# Patient Record
Sex: MALE | Race: WHITE | NOT HISPANIC OR LATINO | Employment: OTHER | URBAN - METROPOLITAN AREA
[De-identification: names, ages, dates, MRNs, and addresses within clinical notes are randomized per-mention and may not be internally consistent; named-entity substitution may affect disease eponyms.]

---

## 2019-09-25 ENCOUNTER — TRANSCRIBE ORDERS (OUTPATIENT)
Dept: ADMINISTRATIVE | Facility: HOSPITAL | Age: 69
End: 2019-09-25

## 2019-09-25 DIAGNOSIS — G50.0 TRIGEMINAL NEURALGIA: Primary | ICD-10-CM

## 2019-10-16 ENCOUNTER — HOSPITAL ENCOUNTER (OUTPATIENT)
Dept: MRI IMAGING | Facility: CLINIC | Age: 69
Discharge: HOME | End: 2019-10-16
Payer: COMMERCIAL

## 2019-10-16 ENCOUNTER — TRANSCRIBE ORDERS (OUTPATIENT)
Dept: LAB | Facility: CLINIC | Age: 69
End: 2019-10-16

## 2019-10-16 DIAGNOSIS — G50.0 TRIGEMINAL NEURALGIA: ICD-10-CM

## 2019-10-16 DIAGNOSIS — Z51.81 ENCOUNTER FOR THERAPEUTIC DRUG MONITORING: Primary | ICD-10-CM

## 2019-10-16 PROCEDURE — 70553 MRI BRAIN STEM W/O & W/DYE: CPT

## 2019-10-16 PROCEDURE — A9585 GADOBUTROL INJECTION: HCPCS | Performed by: PSYCHIATRY & NEUROLOGY

## 2019-10-16 RX ADMIN — GADOBUTROL 10 ML: 604.72 INJECTION INTRAVENOUS at 14:51

## 2019-10-17 ENCOUNTER — TRANSCRIBE ORDERS (OUTPATIENT)
Dept: ADMINISTRATIVE | Facility: HOSPITAL | Age: 69
End: 2019-10-17

## 2019-10-17 ENCOUNTER — APPOINTMENT (OUTPATIENT)
Dept: LAB | Facility: HOSPITAL | Age: 69
End: 2019-10-17
Attending: PSYCHIATRY & NEUROLOGY
Payer: COMMERCIAL

## 2019-10-17 DIAGNOSIS — R53.81 DEBILITY: ICD-10-CM

## 2019-10-17 DIAGNOSIS — R53.0 NEOPLASTIC MALIGNANT RELATED FATIGUE: ICD-10-CM

## 2019-10-17 DIAGNOSIS — R53.83 TIREDNESS: ICD-10-CM

## 2019-10-17 DIAGNOSIS — G93.3 POST VIRAL SYNDROME: ICD-10-CM

## 2019-10-17 DIAGNOSIS — E63.9 NUTRITIONAL AND METABOLIC CARDIOMYOPATHY (HCC): ICD-10-CM

## 2019-10-17 DIAGNOSIS — G60.9 IDIOPATHIC PERIPHERAL NEUROPATHY: ICD-10-CM

## 2019-10-17 DIAGNOSIS — E61.9 DEFICIENCY OF NUTRIENT ELEMENT: ICD-10-CM

## 2019-10-17 DIAGNOSIS — R53.1 ASTHENIA: ICD-10-CM

## 2019-10-17 DIAGNOSIS — E11.9 DIABETES MELLITUS WITHOUT COMPLICATION (HCC): ICD-10-CM

## 2019-10-17 DIAGNOSIS — E21.3 HYPERPARATHYROIDISM, UNSPECIFIED (HCC): ICD-10-CM

## 2019-10-17 DIAGNOSIS — E21.3 HYPERPARATHYROIDISM, UNSPECIFIED (HCC): Primary | ICD-10-CM

## 2019-10-17 DIAGNOSIS — R73.09 OTHER ABNORMAL GLUCOSE: ICD-10-CM

## 2019-10-17 DIAGNOSIS — I43 NUTRITIONAL AND METABOLIC CARDIOMYOPATHY (HCC): ICD-10-CM

## 2019-10-17 DIAGNOSIS — R73.9 BLOOD GLUCOSE ELEVATED: ICD-10-CM

## 2019-10-17 DIAGNOSIS — E64.9 SEQUELA OF NUTRITIONAL DEFICIENCY: ICD-10-CM

## 2019-10-17 DIAGNOSIS — R73.09 IMPAIRED GLUCOSE TOLERANCE TEST: ICD-10-CM

## 2019-10-17 DIAGNOSIS — E88.9 NUTRITIONAL AND METABOLIC CARDIOMYOPATHY (HCC): ICD-10-CM

## 2019-10-17 DIAGNOSIS — M12.9 ACUTE ARTHROPATHY: ICD-10-CM

## 2019-10-17 DIAGNOSIS — Z13.220 SCREENING FOR LIPOID DISORDERS: ICD-10-CM

## 2019-10-17 LAB
25(OH)D3 SERPL-MCNC: 35.6 NG/ML (ref 30–100)
ALBUMIN SERPL BCP-MCNC: 4.2 G/DL (ref 3.5–5)
ALP SERPL-CCNC: 98 U/L (ref 46–116)
ALT SERPL W P-5'-P-CCNC: 21 U/L (ref 12–78)
ANION GAP SERPL CALCULATED.3IONS-SCNC: 10 MMOL/L (ref 4–13)
AST SERPL W P-5'-P-CCNC: 15 U/L (ref 5–45)
BASOPHILS # BLD AUTO: 0.06 THOUSANDS/ΜL (ref 0–0.1)
BASOPHILS NFR BLD AUTO: 1 % (ref 0–1)
BILIRUB DIRECT SERPL-MCNC: 0.12 MG/DL (ref 0–0.2)
BILIRUB SERPL-MCNC: 0.3 MG/DL (ref 0.2–1)
BUN SERPL-MCNC: 18 MG/DL (ref 5–25)
CALCIUM SERPL-MCNC: 9 MG/DL (ref 8.3–10.1)
CHLORIDE SERPL-SCNC: 101 MMOL/L (ref 100–108)
CO2 SERPL-SCNC: 29 MMOL/L (ref 21–32)
CREAT SERPL-MCNC: 0.87 MG/DL (ref 0.6–1.3)
CRP SERPL QL: <3 MG/L
EOSINOPHIL # BLD AUTO: 0.17 THOUSAND/ΜL (ref 0–0.61)
EOSINOPHIL NFR BLD AUTO: 4 % (ref 0–6)
ERYTHROCYTE [DISTWIDTH] IN BLOOD BY AUTOMATED COUNT: 12.3 % (ref 11.6–15.1)
ERYTHROCYTE [SEDIMENTATION RATE] IN BLOOD: 4 MM/HOUR (ref 0–10)
EST. AVERAGE GLUCOSE BLD GHB EST-MCNC: 214 MG/DL
GFR SERPL CREATININE-BSD FRML MDRD: 90 ML/MIN/1.73SQ M
GLUCOSE SERPL-MCNC: 156 MG/DL (ref 65–140)
HBA1C MFR BLD: 9.1 % (ref 4.2–6.3)
HCT VFR BLD AUTO: 44.3 % (ref 36.5–49.3)
HGB BLD-MCNC: 14.7 G/DL (ref 12–17)
IMM GRANULOCYTES # BLD AUTO: 0.01 THOUSAND/UL (ref 0–0.2)
IMM GRANULOCYTES NFR BLD AUTO: 0 % (ref 0–2)
LYMPHOCYTES # BLD AUTO: 1.03 THOUSANDS/ΜL (ref 0.6–4.47)
LYMPHOCYTES NFR BLD AUTO: 23 % (ref 14–44)
MAGNESIUM SERPL-MCNC: 1.8 MG/DL (ref 1.6–2.6)
MCH RBC QN AUTO: 31.1 PG (ref 26.8–34.3)
MCHC RBC AUTO-ENTMCNC: 33.2 G/DL (ref 31.4–37.4)
MCV RBC AUTO: 94 FL (ref 82–98)
MONOCYTES # BLD AUTO: 0.42 THOUSAND/ΜL (ref 0.17–1.22)
MONOCYTES NFR BLD AUTO: 10 % (ref 4–12)
NEUTROPHILS # BLD AUTO: 2.75 THOUSANDS/ΜL (ref 1.85–7.62)
NEUTS SEG NFR BLD AUTO: 62 % (ref 43–75)
NRBC BLD AUTO-RTO: 0 /100 WBCS
PHOSPHATE SERPL-MCNC: 3.4 MG/DL (ref 2.3–4.1)
PLATELET # BLD AUTO: 234 THOUSANDS/UL (ref 149–390)
PMV BLD AUTO: 9.7 FL (ref 8.9–12.7)
POTASSIUM SERPL-SCNC: 4.9 MMOL/L (ref 3.5–5.3)
PROT SERPL-MCNC: 7.4 G/DL (ref 6.4–8.2)
PTH-INTACT SERPL-MCNC: 39.4 PG/ML (ref 18.4–80.1)
RBC # BLD AUTO: 4.72 MILLION/UL (ref 3.88–5.62)
SODIUM SERPL-SCNC: 140 MMOL/L (ref 136–145)
TSH SERPL DL<=0.05 MIU/L-ACNC: 5.11 UIU/ML (ref 0.36–3.74)
VIT B12 SERPL-MCNC: 308 PG/ML (ref 100–900)
WBC # BLD AUTO: 4.44 THOUSAND/UL (ref 4.31–10.16)

## 2019-10-17 PROCEDURE — 80051 ELECTROLYTE PANEL: CPT

## 2019-10-17 PROCEDURE — 84165 PROTEIN E-PHORESIS SERUM: CPT | Performed by: PATHOLOGY

## 2019-10-17 PROCEDURE — 82306 VITAMIN D 25 HYDROXY: CPT

## 2019-10-17 PROCEDURE — 83970 ASSAY OF PARATHORMONE: CPT

## 2019-10-17 PROCEDURE — 86038 ANTINUCLEAR ANTIBODIES: CPT

## 2019-10-17 PROCEDURE — 82565 ASSAY OF CREATININE: CPT

## 2019-10-17 PROCEDURE — 83036 HEMOGLOBIN GLYCOSYLATED A1C: CPT

## 2019-10-17 PROCEDURE — 83825 ASSAY OF MERCURY: CPT

## 2019-10-17 PROCEDURE — 36415 COLL VENOUS BLD VENIPUNCTURE: CPT

## 2019-10-17 PROCEDURE — 85025 COMPLETE CBC W/AUTO DIFF WBC: CPT

## 2019-10-17 PROCEDURE — 86225 DNA ANTIBODY NATIVE: CPT

## 2019-10-17 PROCEDURE — 82947 ASSAY GLUCOSE BLOOD QUANT: CPT

## 2019-10-17 PROCEDURE — 86157 COLD AGGLUTININ TITER: CPT

## 2019-10-17 PROCEDURE — 82164 ANGIOTENSIN I ENZYME TEST: CPT

## 2019-10-17 PROCEDURE — 83018 HEAVY METAL QUAN EACH NES: CPT

## 2019-10-17 PROCEDURE — 86430 RHEUMATOID FACTOR TEST QUAL: CPT

## 2019-10-17 PROCEDURE — 86235 NUCLEAR ANTIGEN ANTIBODY: CPT

## 2019-10-17 PROCEDURE — 82175 ASSAY OF ARSENIC: CPT

## 2019-10-17 PROCEDURE — 82310 ASSAY OF CALCIUM: CPT

## 2019-10-17 PROCEDURE — 82595 ASSAY OF CRYOGLOBULIN: CPT

## 2019-10-17 PROCEDURE — 82570 ASSAY OF URINE CREATININE: CPT

## 2019-10-17 PROCEDURE — 82300 ASSAY OF CADMIUM: CPT

## 2019-10-17 PROCEDURE — 83883 ASSAY NEPHELOMETRY NOT SPEC: CPT

## 2019-10-17 PROCEDURE — 84443 ASSAY THYROID STIM HORMONE: CPT

## 2019-10-17 PROCEDURE — 84165 PROTEIN E-PHORESIS SERUM: CPT

## 2019-10-17 PROCEDURE — 83735 ASSAY OF MAGNESIUM: CPT

## 2019-10-17 PROCEDURE — 86618 LYME DISEASE ANTIBODY: CPT

## 2019-10-17 PROCEDURE — 86140 C-REACTIVE PROTEIN: CPT

## 2019-10-17 PROCEDURE — 85652 RBC SED RATE AUTOMATED: CPT

## 2019-10-17 PROCEDURE — 80076 HEPATIC FUNCTION PANEL: CPT

## 2019-10-17 PROCEDURE — 83655 ASSAY OF LEAD: CPT

## 2019-10-17 PROCEDURE — 82607 VITAMIN B-12: CPT

## 2019-10-17 PROCEDURE — 84100 ASSAY OF PHOSPHORUS: CPT

## 2019-10-17 PROCEDURE — 84520 ASSAY OF UREA NITROGEN: CPT

## 2019-10-18 LAB
ACE SERPL-CCNC: 16 U/L (ref 14–82)
B BURGDOR IGG+IGM SER-ACNC: <0.91 ISR (ref 0–0.9)
CA TITR SERPL AGGL: NEGATIVE {TITER}
DSDNA AB SER-ACNC: <1 IU/ML (ref 0–9)
ENA SS-A AB SER-ACNC: <0.2 AI (ref 0–0.9)
ENA SS-B AB SER-ACNC: <0.2 AI (ref 0–0.9)
KAPPA LC FREE SER-MCNC: 22.4 MG/L (ref 3.3–19.4)
KAPPA LC FREE/LAMBDA FREE SER: 1.47 {RATIO} (ref 0.26–1.65)
LAMBDA LC FREE SERPL-MCNC: 15.2 MG/L (ref 5.7–26.3)
RHEUMATOID FACT SER QL LA: NEGATIVE
RYE IGE QN: NEGATIVE

## 2019-10-19 LAB
ALBUMIN SERPL ELPH-MCNC: 4.53 G/DL (ref 3.5–5)
ALBUMIN SERPL ELPH-MCNC: 64.7 % (ref 52–65)
ALPHA1 GLOB SERPL ELPH-MCNC: 0.24 G/DL (ref 0.1–0.4)
ALPHA1 GLOB SERPL ELPH-MCNC: 3.4 % (ref 2.5–5)
ALPHA2 GLOB SERPL ELPH-MCNC: 0.71 G/DL (ref 0.4–1.2)
ALPHA2 GLOB SERPL ELPH-MCNC: 10.2 % (ref 7–13)
BETA GLOB ABNORMAL SERPL ELPH-MCNC: 0.39 G/DL (ref 0.4–0.8)
BETA1 GLOB SERPL ELPH-MCNC: 5.5 % (ref 5–13)
BETA2 GLOB SERPL ELPH-MCNC: 5 % (ref 2–8)
BETA2+GAMMA GLOB SERPL ELPH-MCNC: 0.35 G/DL (ref 0.2–0.5)
GAMMA GLOB ABNORMAL SERPL ELPH-MCNC: 0.78 G/DL (ref 0.5–1.6)
GAMMA GLOB SERPL ELPH-MCNC: 11.2 % (ref 12–22)
IGG/ALB SER: 1.83 {RATIO} (ref 1.1–1.8)
PROT PATTERN SERPL ELPH-IMP: ABNORMAL
PROT SERPL-MCNC: 7 G/DL (ref 6.4–8.2)

## 2019-10-22 LAB
ARSENIC BLD-MCNC: 5 UG/L (ref 2–23)
CADMIUM BLD-MCNC: NORMAL UG/L (ref 0–1.2)
CRYOGLOB SER QL 1D COLD INC: NORMAL
LEAD BLD-MCNC: 1 UG/DL (ref 0–4)
MERCURY BLD-MCNC: NORMAL UG/L (ref 0–14.9)

## 2019-10-28 LAB — MISCELLANEOUS LAB TEST RESULT: NORMAL

## 2021-06-24 ENCOUNTER — APPOINTMENT (EMERGENCY)
Dept: CT IMAGING | Facility: HOSPITAL | Age: 71
End: 2021-06-24
Payer: COMMERCIAL

## 2021-06-24 ENCOUNTER — HOSPITAL ENCOUNTER (OUTPATIENT)
Facility: HOSPITAL | Age: 71
Setting detail: OBSERVATION
End: 2021-06-25
Attending: EMERGENCY MEDICINE | Admitting: FAMILY MEDICINE
Payer: COMMERCIAL

## 2021-06-24 DIAGNOSIS — H53.122 VISUAL LOSS, TRANSIENT, LEFT: Primary | ICD-10-CM

## 2021-06-24 LAB
ALBUMIN SERPL BCP-MCNC: 4.3 G/DL (ref 3.5–5)
ALP SERPL-CCNC: 93 U/L (ref 46–116)
ALT SERPL W P-5'-P-CCNC: 30 U/L (ref 12–78)
ANION GAP SERPL CALCULATED.3IONS-SCNC: 8 MMOL/L (ref 4–13)
AST SERPL W P-5'-P-CCNC: 20 U/L (ref 5–45)
BASOPHILS # BLD AUTO: 0.07 THOUSANDS/ΜL (ref 0–0.1)
BASOPHILS NFR BLD AUTO: 1 % (ref 0–1)
BILIRUB SERPL-MCNC: 0.43 MG/DL (ref 0.2–1)
BUN SERPL-MCNC: 32 MG/DL (ref 5–25)
CALCIUM SERPL-MCNC: 9.4 MG/DL (ref 8.3–10.1)
CHLORIDE SERPL-SCNC: 103 MMOL/L (ref 100–108)
CO2 SERPL-SCNC: 29 MMOL/L (ref 21–32)
CREAT SERPL-MCNC: 1.22 MG/DL (ref 0.6–1.3)
EOSINOPHIL # BLD AUTO: 0.19 THOUSAND/ΜL (ref 0–0.61)
EOSINOPHIL NFR BLD AUTO: 3 % (ref 0–6)
ERYTHROCYTE [DISTWIDTH] IN BLOOD BY AUTOMATED COUNT: 13.6 % (ref 11.6–15.1)
GFR SERPL CREATININE-BSD FRML MDRD: 61 ML/MIN/1.73SQ M
GLUCOSE SERPL-MCNC: 123 MG/DL (ref 65–140)
HCT VFR BLD AUTO: 44.9 % (ref 36.5–49.3)
HGB BLD-MCNC: 14.7 G/DL (ref 12–17)
IMM GRANULOCYTES # BLD AUTO: 0.02 THOUSAND/UL (ref 0–0.2)
IMM GRANULOCYTES NFR BLD AUTO: 0 % (ref 0–2)
LYMPHOCYTES # BLD AUTO: 1.85 THOUSANDS/ΜL (ref 0.6–4.47)
LYMPHOCYTES NFR BLD AUTO: 26 % (ref 14–44)
MCH RBC QN AUTO: 31.3 PG (ref 26.8–34.3)
MCHC RBC AUTO-ENTMCNC: 32.7 G/DL (ref 31.4–37.4)
MCV RBC AUTO: 96 FL (ref 82–98)
MONOCYTES # BLD AUTO: 0.58 THOUSAND/ΜL (ref 0.17–1.22)
MONOCYTES NFR BLD AUTO: 8 % (ref 4–12)
NEUTROPHILS # BLD AUTO: 4.33 THOUSANDS/ΜL (ref 1.85–7.62)
NEUTS SEG NFR BLD AUTO: 62 % (ref 43–75)
NRBC BLD AUTO-RTO: 0 /100 WBCS
PLATELET # BLD AUTO: 260 THOUSANDS/UL (ref 149–390)
PMV BLD AUTO: 10.9 FL (ref 8.9–12.7)
POTASSIUM SERPL-SCNC: 4.8 MMOL/L (ref 3.5–5.3)
PROT SERPL-MCNC: 7.6 G/DL (ref 6.4–8.2)
RBC # BLD AUTO: 4.69 MILLION/UL (ref 3.88–5.62)
SODIUM SERPL-SCNC: 140 MMOL/L (ref 136–145)
TROPONIN I SERPL-MCNC: <0.02 NG/ML
WBC # BLD AUTO: 7.04 THOUSAND/UL (ref 4.31–10.16)

## 2021-06-24 PROCEDURE — 70498 CT ANGIOGRAPHY NECK: CPT

## 2021-06-24 PROCEDURE — 99285 EMERGENCY DEPT VISIT HI MDM: CPT | Performed by: EMERGENCY MEDICINE

## 2021-06-24 PROCEDURE — 36415 COLL VENOUS BLD VENIPUNCTURE: CPT | Performed by: PHYSICIAN ASSISTANT

## 2021-06-24 PROCEDURE — 99285 EMERGENCY DEPT VISIT HI MDM: CPT

## 2021-06-24 PROCEDURE — 93005 ELECTROCARDIOGRAM TRACING: CPT

## 2021-06-24 PROCEDURE — 99220 PR INITIAL OBSERVATION CARE/DAY 70 MINUTES: CPT | Performed by: PHYSICIAN ASSISTANT

## 2021-06-24 PROCEDURE — 70496 CT ANGIOGRAPHY HEAD: CPT

## 2021-06-24 PROCEDURE — 84484 ASSAY OF TROPONIN QUANT: CPT | Performed by: PHYSICIAN ASSISTANT

## 2021-06-24 PROCEDURE — 80053 COMPREHEN METABOLIC PANEL: CPT | Performed by: PHYSICIAN ASSISTANT

## 2021-06-24 PROCEDURE — 85025 COMPLETE CBC W/AUTO DIFF WBC: CPT | Performed by: PHYSICIAN ASSISTANT

## 2021-06-24 RX ORDER — GABAPENTIN 100 MG/1
100 CAPSULE ORAL 2 TIMES DAILY
COMMUNITY

## 2021-06-24 RX ORDER — INSULIN GLARGINE 100 [IU]/ML
20 INJECTION, SOLUTION SUBCUTANEOUS
COMMUNITY

## 2021-06-24 RX ORDER — DORZOLAMIDE HYDROCHLORIDE AND TIMOLOL MALEATE 20; 5 MG/ML; MG/ML
1 SOLUTION/ DROPS OPHTHALMIC 2 TIMES DAILY
Status: DISCONTINUED | OUTPATIENT
Start: 2021-06-24 | End: 2021-06-25 | Stop reason: HOSPADM

## 2021-06-24 RX ORDER — BRIMONIDINE TARTRATE 0.15 %
1 DROPS OPHTHALMIC (EYE) 2 TIMES DAILY
Status: DISCONTINUED | OUTPATIENT
Start: 2021-06-24 | End: 2021-06-25 | Stop reason: HOSPADM

## 2021-06-24 RX ORDER — PSEUDOEPHEDRINE HCL 30 MG
100 TABLET ORAL DAILY
COMMUNITY

## 2021-06-24 RX ORDER — GLIPIZIDE 10 MG/1
10 TABLET ORAL
COMMUNITY

## 2021-06-24 RX ORDER — GABAPENTIN 600 MG/1
600 TABLET ORAL 3 TIMES DAILY
COMMUNITY

## 2021-06-24 RX ORDER — CHLORAL HYDRATE 500 MG
2 CAPSULE ORAL
COMMUNITY

## 2021-06-24 RX ORDER — AMLODIPINE BESYLATE 5 MG/1
5 TABLET ORAL DAILY
COMMUNITY

## 2021-06-24 RX ORDER — LISINOPRIL 20 MG/1
20 TABLET ORAL DAILY
COMMUNITY

## 2021-06-24 RX ADMIN — BRIMONIDINE TARTRATE 1 DROP: 1.5 SOLUTION OPHTHALMIC at 20:43

## 2021-06-24 RX ADMIN — IOHEXOL 85 ML: 350 INJECTION, SOLUTION INTRAVENOUS at 20:35

## 2021-06-24 RX ADMIN — DORZOLAMIDE HYDROCHLORIDE AND TIMOLOL MALEATE 1 DROP: 22.3; 6.8 SOLUTION/ DROPS OPHTHALMIC at 20:29

## 2021-06-25 ENCOUNTER — APPOINTMENT (OUTPATIENT)
Dept: MRI IMAGING | Facility: HOSPITAL | Age: 71
End: 2021-06-25
Payer: COMMERCIAL

## 2021-06-25 ENCOUNTER — APPOINTMENT (OUTPATIENT)
Dept: NON INVASIVE DIAGNOSTICS | Facility: HOSPITAL | Age: 71
End: 2021-06-25
Payer: COMMERCIAL

## 2021-06-25 VITALS
HEIGHT: 73 IN | DIASTOLIC BLOOD PRESSURE: 66 MMHG | SYSTOLIC BLOOD PRESSURE: 127 MMHG | BODY MASS INDEX: 30.34 KG/M2 | TEMPERATURE: 97.7 F | RESPIRATION RATE: 23 BRPM | OXYGEN SATURATION: 96 % | HEART RATE: 57 BPM

## 2021-06-25 PROBLEM — G50.0 TRIGEMINAL NEURALGIA: Status: ACTIVE | Noted: 2020-01-29

## 2021-06-25 PROBLEM — H53.122: Status: ACTIVE | Noted: 2021-06-25

## 2021-06-25 PROBLEM — E11.9 DIABETES MELLITUS TYPE 2, CONTROLLED (HCC): Status: ACTIVE | Noted: 2021-06-25

## 2021-06-25 PROBLEM — Z86.69 HISTORY OF RETINAL DETACHMENT: Chronic | Status: ACTIVE | Noted: 2021-06-25

## 2021-06-25 LAB
CHOLEST SERPL-MCNC: 120 MG/DL (ref 50–200)
EST. AVERAGE GLUCOSE BLD GHB EST-MCNC: 169 MG/DL
GLUCOSE SERPL-MCNC: 174 MG/DL (ref 65–140)
GLUCOSE SERPL-MCNC: 201 MG/DL (ref 65–140)
GLUCOSE SERPL-MCNC: 213 MG/DL (ref 65–140)
HBA1C MFR BLD: 7.5 %
HDLC SERPL-MCNC: 41 MG/DL
LDLC SERPL CALC-MCNC: 39 MG/DL (ref 0–100)
TRIGL SERPL-MCNC: 198 MG/DL

## 2021-06-25 PROCEDURE — G1004 CDSM NDSC: HCPCS

## 2021-06-25 PROCEDURE — 93306 TTE W/DOPPLER COMPLETE: CPT

## 2021-06-25 PROCEDURE — 99217 PR OBSERVATION CARE DISCHARGE MANAGEMENT: CPT | Performed by: STUDENT IN AN ORGANIZED HEALTH CARE EDUCATION/TRAINING PROGRAM

## 2021-06-25 PROCEDURE — 83036 HEMOGLOBIN GLYCOSYLATED A1C: CPT | Performed by: PHYSICIAN ASSISTANT

## 2021-06-25 PROCEDURE — 82948 REAGENT STRIP/BLOOD GLUCOSE: CPT

## 2021-06-25 PROCEDURE — 36415 COLL VENOUS BLD VENIPUNCTURE: CPT | Performed by: PHYSICIAN ASSISTANT

## 2021-06-25 PROCEDURE — 99285 EMERGENCY DEPT VISIT HI MDM: CPT | Performed by: PSYCHIATRY & NEUROLOGY

## 2021-06-25 PROCEDURE — 93306 TTE W/DOPPLER COMPLETE: CPT | Performed by: INTERNAL MEDICINE

## 2021-06-25 PROCEDURE — 70551 MRI BRAIN STEM W/O DYE: CPT

## 2021-06-25 PROCEDURE — 80061 LIPID PANEL: CPT | Performed by: PHYSICIAN ASSISTANT

## 2021-06-25 RX ORDER — AMLODIPINE BESYLATE 5 MG/1
5 TABLET ORAL DAILY
Status: DISCONTINUED | OUTPATIENT
Start: 2021-06-25 | End: 2021-06-25 | Stop reason: HOSPADM

## 2021-06-25 RX ORDER — HEPARIN SODIUM 5000 [USP'U]/ML
5000 INJECTION, SOLUTION INTRAVENOUS; SUBCUTANEOUS EVERY 8 HOURS SCHEDULED
Status: DISCONTINUED | OUTPATIENT
Start: 2021-06-25 | End: 2021-06-25 | Stop reason: HOSPADM

## 2021-06-25 RX ORDER — GABAPENTIN 300 MG/1
600 CAPSULE ORAL 3 TIMES DAILY
Status: DISCONTINUED | OUTPATIENT
Start: 2021-06-25 | End: 2021-06-25 | Stop reason: HOSPADM

## 2021-06-25 RX ORDER — ATORVASTATIN CALCIUM 10 MG/1
10 TABLET, FILM COATED ORAL EVERY EVENING
Status: DISCONTINUED | OUTPATIENT
Start: 2021-06-25 | End: 2021-06-25 | Stop reason: HOSPADM

## 2021-06-25 RX ORDER — INSULIN GLARGINE 100 [IU]/ML
20 INJECTION, SOLUTION SUBCUTANEOUS
Status: DISCONTINUED | OUTPATIENT
Start: 2021-06-25 | End: 2021-06-25 | Stop reason: HOSPADM

## 2021-06-25 RX ORDER — DOCUSATE SODIUM 100 MG/1
100 CAPSULE, LIQUID FILLED ORAL DAILY
Status: DISCONTINUED | OUTPATIENT
Start: 2021-06-25 | End: 2021-06-25 | Stop reason: HOSPADM

## 2021-06-25 RX ORDER — ASPIRIN 81 MG/1
81 TABLET, CHEWABLE ORAL DAILY
Status: DISCONTINUED | OUTPATIENT
Start: 2021-06-25 | End: 2021-06-25 | Stop reason: HOSPADM

## 2021-06-25 RX ORDER — LISINOPRIL 10 MG/1
20 TABLET ORAL DAILY
Status: DISCONTINUED | OUTPATIENT
Start: 2021-06-25 | End: 2021-06-25 | Stop reason: HOSPADM

## 2021-06-25 RX ADMIN — GABAPENTIN 600 MG: 300 CAPSULE ORAL at 08:44

## 2021-06-25 RX ADMIN — INSULIN LISPRO 1 UNITS: 100 INJECTION, SOLUTION INTRAVENOUS; SUBCUTANEOUS at 11:07

## 2021-06-25 RX ADMIN — BRIMONIDINE TARTRATE 1 DROP: 1.5 SOLUTION OPHTHALMIC at 08:45

## 2021-06-25 RX ADMIN — INSULIN GLARGINE 20 UNITS: 100 INJECTION, SOLUTION SUBCUTANEOUS at 01:00

## 2021-06-25 RX ADMIN — AMLODIPINE BESYLATE 5 MG: 5 TABLET ORAL at 08:44

## 2021-06-25 RX ADMIN — LISINOPRIL 20 MG: 10 TABLET ORAL at 08:44

## 2021-06-25 RX ADMIN — DOCUSATE SODIUM 100 MG: 100 CAPSULE, LIQUID FILLED ORAL at 08:45

## 2021-06-25 RX ADMIN — DORZOLAMIDE HYDROCHLORIDE AND TIMOLOL MALEATE 1 DROP: 22.3; 6.8 SOLUTION/ DROPS OPHTHALMIC at 08:45

## 2021-06-25 RX ADMIN — ASPIRIN 81 MG CHEWABLE TABLET 81 MG: 81 TABLET CHEWABLE at 08:44

## 2021-06-25 RX ADMIN — INSULIN LISPRO 1 UNITS: 100 INJECTION, SOLUTION INTRAVENOUS; SUBCUTANEOUS at 08:43

## 2021-06-25 RX ADMIN — HEPARIN SODIUM 5000 UNITS: 5000 INJECTION INTRAVENOUS; SUBCUTANEOUS at 06:10

## 2021-06-25 NOTE — ASSESSMENT & PLAN NOTE
· Around 2:00 p m   Had complete left visual loss for about 45 minutes  · CT A negative for acute infarct, hemorrhage, no retro-orbital inflammation  · See plan above

## 2021-06-25 NOTE — ASSESSMENT & PLAN NOTE
· Follows outpatient with ophthalmologist Dr Mirian Witt in [facility]  · Was referred to ED for stroke rule out  · MRI imaging negative for acute pathology, stroke ruled out per Dr Enrique Eugene request   · Retinal detachment surgery was performed on left eye 1 month ago at [facility]  · Neurology evaluated patient recommending ophtho evaluation  · Discussed case with on call ophthomologist at [facility] at 11:30 AM on 6/25, recommended Retinal Surgery evaluation  · [address]  who initially recommended outpatient follow up on 6/30, later expedited to 6/28, now upon further discussion recommended he be discharged from [facility] and go immediately to their clinic to be evaluated today on 6/25  · Gave instructions to patient and his wife   Discharged

## 2021-06-25 NOTE — ASSESSMENT & PLAN NOTE
44-year-old male with hypertension, dyslipidemia, DM2 with diabetic neuropathy, trigeminal neuralgia status post balloon compression procedure with chronic right facial numbness, recent left retinal detachment with left vitrectomy, presents with visual disturbance of the left eye  CTA head/neck demonstrated no flow consequential stenosis or LVO  CT head demonstrated no acute changes  Patient's left eye visual acuity has improved since starting eye drops in the emergency department  However, eye sight is not back at baseline  Lower suspicion for acute ischemia as etiology of visual disturbance  Higher likelihood of ophthalmologic such as retinal issue, for which patient had recent procedure  Plan:  -per discussion with primary team, patient to be transferred to alternate facility for formal ophthalmological evaluation   -would obtain MRI brain if patient were to stay here  However, this would delay his transfer  Feel he would most benefit from urgent ophthalmological evaluation   -as such, no further inpatient neurologic recommendations

## 2021-06-25 NOTE — DISCHARGE INSTRUCTIONS
Follow up with [facility] in [place] today on 6/25/21  Address is AVS follow up instructions

## 2021-06-25 NOTE — UTILIZATION REVIEW
Initial Clinical Review    Admission: Date/Time/Statement:   Admission Orders (From admission, onward)     Ordered        06/24/21 2133  Place in Observation  Once                   Orders Placed This Encounter   Procedures    Place in Observation     Standing Status:   Standing     Number of Occurrences:   1     Order Specific Question:   Level of Care     Answer:   Med Surg [16]     ED Arrival Information     Expected Arrival Acuity    - 6/24/2021 17:55 Emergent         Means of arrival Escorted by Service Admission type    [address] Emergency         Arrival complaint    post op eye problem        Chief Complaint   Patient presents with    Eye Problem     pt had detached retna surgery in may, pain in L eye, pt reports that he lost his vision for 45 minutes in the L eye today        Initial Presentation:  79 yo male PMHx DM type 2 , hx of retinal detachment to ED from home with left eye vision loss lasting 45 minutes, now resolved  Admitted Observation  TIA, CVA, metabolic abnormality  CTA negative  Labs unremarkable  Elevated pressure of left eye  Discussed with his ophtho attending recommended cosopt BID and brimonidine BID  Will admit to eval for TIA   Opthalmology  recommends outpatient follow up with ophtho after his eval for tia    Date: 6/25   Day 2:       ED Triage Vitals [06/24/21 1814]   Temperature Pulse Respirations Blood Pressure SpO2   97 7 °F (36 5 °C) 75 17 144/77 97 %      Temp Source Heart Rate Source Patient Position - Orthostatic VS BP Location FiO2 (%)   Oral Monitor Sitting Left arm --      Pain Score       --          Wt Readings from Last 1 Encounters:   06/25/21 105 kg (230 lb 13 2 oz)     Additional Vital Signs:   Date/Time  Temp  Pulse  Resp  BP  MAP (mmHg)  SpO2  O2 Device  Patient Position - Orthostatic VS   06/25/21 0900  --  57  23Abnormal   127/66  91  96 %  None (Room air)  Lying   06/25/21 0854  --  53Abnormal   16  127/66  --  96 %  --  Sitting   06/25/21 0600  --  63  16  124/63  --  95 %  --  --   06/25/21 0500  --  66  16  120/65  85  94 %  --  --   06/25/21 0400  --  73  17  104/58  78  93 %  --  --   06/25/21 0300  --  63  21  101/54  76  95 %  --  --   06/25/21 0200  --  56  19  119/62  84  94 %  --  --   06/25/21 0130  --  54Abnormal   16  119/83  98  93 %  None (Room air)  Lying   06/24/21 1945  --  63  18  139/72  99  100 %  None (Room air)  Lying   06/24/21 1814  97 7 °F (36 5 °C)  75  17  144/77  --  97 %  None (Room air)  Sitting      Weights (last 14 days)    Date/Time  Height   06/24/21 1814  6' 1" (1 854 m)       Pertinent Labs/Diagnostic Test Results:       Results from last 7 days   Lab Units 06/24/21  1946   WBC Thousand/uL 7 04   HEMOGLOBIN g/dL 14 7   HEMATOCRIT % 44 9   PLATELETS Thousands/uL 260   NEUTROS ABS Thousands/µL 4 33         Results from last 7 days   Lab Units 06/24/21  1946   SODIUM mmol/L 140   POTASSIUM mmol/L 4 8   CHLORIDE mmol/L 103   CO2 mmol/L 29   ANION GAP mmol/L 8   BUN mg/dL 32*   CREATININE mg/dL 1 22   EGFR ml/min/1 73sq m 61   CALCIUM mg/dL 9 4     Results from last 7 days   Lab Units 06/24/21  1946   AST U/L 20   ALT U/L 30   ALK PHOS U/L 93   TOTAL PROTEIN g/dL 7 6   ALBUMIN g/dL 4 3   TOTAL BILIRUBIN mg/dL 0 43     Results from last 7 days   Lab Units 06/25/21  0723 06/25/21  0057   POC GLUCOSE mg/dl 213* 201*     Results from last 7 days   Lab Units 06/24/21  1946   GLUCOSE RANDOM mg/dL 123         Results from last 7 days   Lab Units 06/25/21  0508   HEMOGLOBIN A1C % 7 5*   EAG mg/dl 169     No results found for: BETA-HYDROXYBUTYRATE                   Results from last 7 days   Lab Units 06/24/21 1946   TROPONIN I ng/mL <0 02     CTA head and neck w wo contrast   Final Result by  MD (06/24 2111)         1  No evidence of acute infarct, intracranial hemorrhage or mass effect  2   Postsurgical change from left vitrectomy     3   No stenosis, dissection or occlusion of the carotid or vertebral arteries or major vessels of the [place]  MRI brain wo contrast    (Results Pending)     6/25 echo= ordered & pending  No ekg available  ED Treatment:   Medication Administration from 06/24/2021 1755 to 06/25/2021 1004       Date/Time Order Dose Route Action     06/25/2021 0845 dorzolamide-timolol (COSOPT) 22 3-6 8 MG/ML ophthalmic solution 1 drop 1 drop Left Eye Given     06/24/2021 2029 dorzolamide-timolol (COSOPT) 22 3-6 8 MG/ML ophthalmic solution 1 drop 1 drop Left Eye Given     06/25/2021 0845 brimonidine (ALPHAGAN P) 0 15 % ophthalmic solution 1 drop 1 drop Left Eye Given     06/24/2021 2043 brimonidine (ALPHAGAN P) 0 15 % ophthalmic solution 1 drop 1 drop Left Eye Given     06/25/2021 0844 amLODIPine (NORVASC) tablet 5 mg 5 mg Oral Given     06/25/2021 0845 docusate sodium (COLACE) capsule 100 mg 100 mg Oral Given     06/25/2021 0844 gabapentin (NEURONTIN) capsule 600 mg 600 mg Oral Given     06/25/2021 0100 insulin glargine (LANTUS) subcutaneous injection 20 Units 0 2 mL 20 Units Subcutaneous Given     06/25/2021 0844 lisinopril (ZESTRIL) tablet 20 mg 20 mg Oral Given     06/25/2021 0844 aspirin chewable tablet 81 mg 81 mg Oral Given     06/25/2021 0610 heparin (porcine) subcutaneous injection 5,000 Units 5,000 Units Subcutaneous Given     06/25/2021 0843 insulin lispro (HumaLOG) 100 units/mL subcutaneous injection 1-5 Units 1 Units Subcutaneous Given        History reviewed  No pertinent past medical history    Present on Admission:   Episode of visual loss of left eye   Diabetes mellitus type 2, controlled ([facility] [address] )      Admitting Diagnosis: Eye problem [H57 9]  Age/Sex: 78 y o  male  Admission Orders:  Telemetry  echo  Neuro checks Q1hr x4hr; Q2hr x8hr; Q4hr x72hr;  Vitals Q1hr x4hr; Q2hr x4hrs; Q4hr x72hr; Q8hr if stable  Nursing dysphagia assessment prior to diet  NPO  Assess NIH stroke scale on admission & DC  PT/OT/SPEECH assess & treat  MRI  scd  Scheduled Medications:  amLODIPine, 5 mg, Oral, Daily  aspirin, 81 mg, Oral, Daily  atorvastatin, 10 mg, Oral, QPM  brimonidine, 1 drop, Left Eye, BID  docusate sodium, 100 mg, Oral, Daily  dorzolamide-timolol, 1 drop, Left Eye, BID  gabapentin, 600 mg, Oral, TID  heparin (porcine), 5,000 Units, Subcutaneous, Q8H RICHARD  insulin glargine, 20 Units, Subcutaneous, HS  insulin lispro, 1-5 Units, Subcutaneous, 4x Daily (AC & HS)  lisinopril, 20 mg, Oral, Daily  Continuous IV Infusions:     PRN Meds:       IP CONSULT TO NEUROLOGY  IP CONSULT TO CASE MANAGEMENT  IP CONSULT TO NUTRITION SERVICES    Network Utilization Review Department  ATTENTION: Please call with any questions or concerns to [phone] and carefully listen to the prompts so that you are directed to the right person  All voicemails are confidential   Hina Woo all requests for admission clinical reviews, approved or denied determinations and any other requests to dedicated fax number below belonging to the campus where the patient is receiving treatment   List of dedicated fax numbers for the Facilities:  [address] DENIALS (Administrative/Medical Necessity) [phone]   [address] (Maternity/NICU/Pediatrics) [phone]   [address] Carlita Medellin [phone] [place] [address] [address]ia [address] [facility] [facility] [phone]

## 2021-06-25 NOTE — ED PROVIDER NOTES
History  Chief Complaint   Patient presents with    Eye Problem     pt had detached retna surgery in may, pain in L eye, pt reports that he lost his vision for 45 minutes in the L eye today      79 yo male pt with vision loss left eye  Transient  Lasted 45 minutes  Described as "vision became static/snowy"  He was still able to vaguely make out the outline of his fingers when placed in from of the eye  Right eye only  Now resolved  Had pain in the eye and a headache when he had that vision loss  Now resolved  He has h/o retinal detachment s/p repair about 1 month ago with ophtho in [place]  He contacted his ophtho who recommended ED evaluation to r/o TIA  History provided by:  Patient   used: No    Eye Problem  Location:  Left eye  Quality: vision loss  Severity:  Severe  Onset quality:  Sudden  Duration:  45 minutes  Timing:  Constant  Progression:  Resolved  Chronicity:  New  Context: not burn, not chemical exposure, not contact lens problem, not direct trauma, not foreign body, not using machinery, not scratch, not smoke exposure and not UV exposure    Relieved by:  Nothing  Worsened by:  Nothing  Ineffective treatments:  None tried  Associated symptoms: headaches    Associated symptoms: no double vision, no photophobia and no vomiting        Prior to Admission Medications   Prescriptions Last Dose Informant Patient Reported? Taking?    Docusate Sodium (DSS) 100 MG CAPS   Yes No   Sig: Take 100 mg by mouth daily   Omega-3 Fatty Acids (Omega-3 Fish Oil) 1000 MG CAPS   Yes No   Sig: Take 2 g by mouth   amLODIPine (NORVASC) 5 mg tablet   Yes No   Sig: Take 5 mg by mouth daily   gabapentin (NEURONTIN) 100 mg capsule   Yes No   Sig: Take 100 mg by mouth 2 (two) times a day   gabapentin (NEURONTIN) 600 MG tablet   Yes No   Sig: Take 600 mg by mouth Three times a day   glipiZIDE (GLUCOTROL) 10 mg tablet   Yes No   Sig: Take 10 mg by mouth   insulin aspart, w/niacinamide, (FIASP) 100 Units/mL injection pen   Yes No   Sig: Inject under the skin 3 (three) times a day before meals   insulin glargine (LANTUS) 100 units/mL subcutaneous injection   Yes No   Sig: Inject 20 Units under the skin daily at bedtime   lisinopril (ZESTRIL) 20 mg tablet   Yes No   Sig: Take 20 mg by mouth daily      Facility-Administered Medications: None       History reviewed  No pertinent past medical history  History reviewed  No pertinent surgical history  History reviewed  No pertinent family history  I have reviewed and agree with the history as documented  E-Cigarette/Vaping     E-Cigarette/Vaping Substances     Social History     Tobacco Use    Smoking status: Never Smoker    Smokeless tobacco: Never Used   Substance Use Topics    Alcohol use: Not Currently    Drug use: Not Currently       Review of Systems   Constitutional: Negative for chills and fever  HENT: Negative for ear pain and sore throat  Eyes: Positive for visual disturbance (left eye)  Negative for double vision, photophobia and pain  Respiratory: Negative for cough and shortness of breath  Cardiovascular: Negative for chest pain and palpitations  Gastrointestinal: Negative for abdominal pain and vomiting  Genitourinary: Negative for dysuria and hematuria  Musculoskeletal: Negative for arthralgias and back pain  Skin: Negative for color change and rash  Neurological: Positive for headaches  Negative for seizures and syncope  All other systems reviewed and are negative  Physical Exam  Physical Exam  Vitals and nursing note reviewed  Constitutional:       Appearance: He is well-developed  HENT:      Head: Normocephalic and atraumatic  Comments: PERRL  Pressure left eye 40mmHg  Right eye 24 mmHg  Eyes:      Conjunctiva/sclera: Conjunctivae normal    Cardiovascular:      Rate and Rhythm: Normal rate and regular rhythm  Heart sounds: No murmur heard       Pulmonary:      Effort: Pulmonary effort is normal  No respiratory distress  Breath sounds: Normal breath sounds  Abdominal:      Palpations: Abdomen is soft  Tenderness: There is no abdominal tenderness  Musculoskeletal:      Cervical back: Neck supple  Skin:     General: Skin is warm and dry  Neurological:      Mental Status: He is alert           Vital Signs  ED Triage Vitals [06/24/21 1814]   Temperature Pulse Respirations Blood Pressure SpO2   97 7 °F (36 5 °C) 75 17 144/77 97 %      Temp Source Heart Rate Source Patient Position - Orthostatic VS BP Location FiO2 (%)   Oral Monitor Sitting Left arm --      Pain Score       --           Vitals:    06/24/21 1814 06/24/21 1945   BP: 144/77 139/72   Pulse: 75 63   Patient Position - Orthostatic VS: Sitting Lying         Visual Acuity      ED Medications  Medications   dorzolamide-timolol (COSOPT) 22 3-6 8 MG/ML ophthalmic solution 1 drop (1 drop Left Eye Given 6/24/21 2029)   brimonidine (ALPHAGAN P) 0 15 % ophthalmic solution 1 drop (1 drop Left Eye Given 6/24/21 2043)   iohexol (OMNIPAQUE) 350 MG/ML injection (SINGLE-DOSE) 85 mL (85 mL Intravenous Given 6/24/21 2035)       Diagnostic Studies  Results Reviewed     Procedure Component Value Units Date/Time    Comprehensive metabolic panel [[id number]]  (Abnormal) Collected: 06/24/21 1946    Lab Status: Final result Specimen: Blood from Arm, Right Updated: 06/24/21 2017     Sodium 140 mmol/L      Potassium 4 8 mmol/L      Chloride 103 mmol/L      CO2 29 mmol/L      ANION GAP 8 mmol/L      BUN 32 mg/dL      Creatinine 1 22 mg/dL      Glucose 123 mg/dL      Calcium 9 4 mg/dL      AST 20 U/L      ALT 30 U/L      Alkaline Phosphatase 93 U/L      Total Protein 7 6 g/dL      Albumin 4 3 g/dL      Total Bilirubin 0 43 mg/dL      eGFR 61 ml/min/1 73sq m     Narrative:      [place] guidelines for Chronic Kidney Disease (CKD):     Stage 1 with normal or high GFR (GFR > 90 mL/min/1 73 square meters)    Stage 2 Mild CKD (GFR = 60-89 mL/min/1 73 square meters)    Stage 3A Moderate CKD (GFR = 45-59 mL/min/1 73 square meters)    Stage 3B Moderate CKD (GFR = 30-44 mL/min/1 73 square meters)    Stage 4 Severe CKD (GFR = 15-29 mL/min/1 73 square meters)    Stage 5 End Stage CKD (GFR <15 mL/min/1 73 square meters)  Note: GFR calculation is accurate only with a steady state creatinine    Troponin I [[id number]]  (Normal) Collected: 06/24/21 1946    Lab Status: Final result Specimen: Blood from Arm, Right Updated: 06/24/21 2010     Troponin I <0 02 ng/mL     CBC and differential [[id number]] Collected: 06/24/21 1946    Lab Status: Final result Specimen: Blood from Arm, Right Updated: 06/24/21 1959     WBC 7 04 Thousand/uL      RBC 4 69 Million/uL      Hemoglobin 14 7 g/dL      Hematocrit 44 9 %      MCV 96 fL      MCH 31 3 pg      MCHC 32 7 g/dL      RDW 13 6 %      MPV 10 9 fL      Platelets 139 Thousands/uL      nRBC 0 /100 WBCs      Neutrophils Relative 62 %      Immat GRANS % 0 %      Lymphocytes Relative 26 %      Monocytes Relative 8 %      Eosinophils Relative 3 %      Basophils Relative 1 %      Neutrophils Absolute 4 33 Thousands/µL      Immature Grans Absolute 0 02 Thousand/uL      Lymphocytes Absolute 1 85 Thousands/µL      Monocytes Absolute 0 58 Thousand/µL      Eosinophils Absolute 0 19 Thousand/µL      Basophils Absolute 0 07 Thousands/µL                  CTA head and neck w wo contrast   Final Result by Milton Krause MD (06/24 2111)         1  No evidence of acute infarct, intracranial hemorrhage or mass effect  2   Postsurgical change from left vitrectomy  3   No stenosis, dissection or occlusion of the carotid or vertebral arteries or major vessels of the [place]  Workstation performed: [id number]                    Procedures  Procedures         ED Course  ED Course as of Jun 24 2200   Thu Jun 24, 2021   [address] Paged patient's ophtho                                                MDM  Number of Diagnoses or Management Options  Visual loss, transient, left: new and requires workup  Diagnosis management comments: DDX including but not limited to: TIA, CVA, metabolic abnormality, cardiac etiology, atypical migraine, seizure, tumor, thyroid disease  Plan: CTA head neck  Discuss with his ophtho       Amount and/or Complexity of Data Reviewed  Clinical lab tests: ordered and reviewed  Tests in the radiology section of CPT®: ordered and reviewed  Independent visualization of images, tracings, or specimens: yes    Risk of Complications, Morbidity, and/or Mortality  Presenting problems: moderate  Management options: low  General comments: 77 yo with left eye vision loss lasting 45 minutes, now resolved  CTA negative  Labs unremarkable  Elevated pressure of left eye  Discussed with his ophtho Dr Rosy Rolon who recommended cosopt BID and brimonidine BID  Will admit to eval for TIA  Dr Rosy Rolon recommends outpatient follow up with ophtho after his eval for tia  Pt agrees with plan  Stable at the time of admission  Patient Progress  Patient progress: stable      Disposition  Final diagnoses:   Visual loss, transient, left     Time reflects when diagnosis was documented in both MDM as applicable and the Disposition within this note     Time User Action Codes Description Comment    6/24/2021  9:32 PM Tucker Siemens Add [H53 122] Visual loss, transient, left       ED Disposition     ED Disposition Condition Date/Time Comment    Admit Stable u Jun 24, 2021  9:31 PM Case was discussed with Curly Lee and the patient's admission status was agreed to be Admission Status: observation status to the service of Dr Amita Regan   Follow-up Information    None         Patient's Medications   Discharge Prescriptions    No medications on file     No discharge procedures on file      PDMP Review     None          ED Provider  Electronically Signed by           Na Joshi PA-C  06/24/21 0250

## 2021-06-25 NOTE — DISCHARGE SUMMARY
[facility]  Discharge- Kit Neat 1950, 78 y o  male MRN: [id number]  Unit/Bed#: SKY Encounter: [id number]  Primary Care Provider: Clarice Hernandez MD   Date and time admitted to hospital: 6/24/2021  6:33 PM    History of retinal detachment  Assessment & Plan  · Follows outpatient with ophthalmologist Dr Leigha Galvin in [facility]  · Was referred to ED for stroke rule out  · MRI imaging negative for acute pathology, stroke ruled out per Dr Basilia Coronado request   · Retinal detachment surgery was performed on left eye 1 month ago at [facility]  · Neurology evaluated patient recommending ophtho evaluation  · Discussed case with on call ophthomologist at [facility] at 11:30 AM on 6/25, recommended Retinal Surgery evaluation  · [address]  who initially recommended outpatient follow up on 6/30, later expedited to 6/28, now upon further discussion recommended he be discharged from [facility] and go immediately to their clinic to be evaluated today on 6/25  · Gave instructions to patient and his wife  Discharged     Diabetes mellitus type 2, controlled [facility])  Assessment & Plan  Lab Results   Component Value Date    HGBA1C 7 5 (H) 06/25/2021       Recent Labs     06/25/21  0057 06/25/21  0723 06/25/21  1107   POCGLU 201* 213* 174*       Blood Sugar Average: Last 72 hrs:  · (P) 196   · Continue home regimen on discharge     * Episode of visual loss of left eye  Assessment & Plan  · Around 2:00 p m   Had complete left visual loss for about 45 minutes  · CT A negative for acute infarct, hemorrhage, no retro-orbital inflammation  · See plan above       Medical Problems     Resolved Problems  Date Reviewed: 6/25/2021    None              Discharging Physician / Practitioner: Hailey Oneal MD  PCP: Clarice Hernandez MD  Admission Date:   Admission Orders (From admission, onward)     Ordered        06/24/21 2133  Place in Observation  Once Discharge Date: 06/25/21    Consultations During Hospital Stay:  [address] TO CASE MANAGEMENT  · IP CONSULT TO NUTRITION SERVICES      Procedures Performed:   MRI brain wo contrast   Final Result by Issa Tobar MD (06/25 1052)      No mass effect, acute intracranial hemorrhage or evidence of recent infarction  Minimal chronic microvascular ischemic change  Post surgical changes of the left lobe status post reported retinal surgery  Incidental note is made of low T1 signal within the C3 vertebral body, not imaged on prior MRI from 10/16/2019  In the absence of any known malignancy 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  Nonemergent follow-up with    bone scan could be considered for further evaluation  The study was marked in [facility] for immediate notification  Workstation performed: [id number]         CTA head and neck w wo contrast   Final Result by Olegario Homans, MD (06/24 2111)         1  No evidence of acute infarct, intracranial hemorrhage or mass effect  2   Postsurgical change from left vitrectomy  3   No stenosis, dissection or occlusion of the carotid or vertebral arteries or major vessels of the [place]  Workstation performed: [id number]         ·   ·     Significant Findings / Test Results:   · See above     Incidental Findings:   · See above      Test Results Pending at Discharge (will require follow up):   · na     Outpatient Tests Requested:  · Follow up with CALLIE Salinas today     Complications:  See above     Reason for Admission: loss of vision in left eye     Hospital Course:   Yolanda Wills is a 78 y o  male patient who originally presented to the hospital on 6/24/2021 due to left eye visual disturbances in setting of recent retinal detachment status post repair in May  Contacted his retinal surgeon who recommended stroke rule out   Admitted for stroke pathway but results negative  Recommended to follow up with [facility] retinal clinic today  Discharged from ED  Condition at Discharge: fair    Discharge Day Visit / Exam:   * Please refer to separate progress note for these details *    Discussion with Family: Updated  (wife) via phone  Discharge instructions/Information to patient and family:   See after visit summary for information provided to patient and family  Provisions for Follow-Up Care:  See after visit summary for information related to follow-up care and any pertinent home health orders  Disposition:   Home    Planned Readmission: none      Discharge Statement:  I spent 30+ minutes discharging the patient  This time was spent on the day of discharge  I had direct contact with the patient on the day of discharge  Greater than 50% of the total time was spent examining patient, answering all patient questions, arranging and discussing plan of care with patient as well as directly providing post-discharge instructions  Additional time then spent on discharge activities  Discharge Medications:  See after visit summary for reconciled discharge medications provided to patient and/or family        **Please Note: This note may have been constructed using a voice recognition system**

## 2021-06-25 NOTE — ASSESSMENT & PLAN NOTE
· Around 2:00 p m  Had complete left visual loss for about 45 minutes  · CT A negative for acute infarct, hemorrhage, no retro-orbital inflammation  · Placed on stroke pathway, telemetry  · Appreciate neurology consult  · Echocardiogram and MRI ordered  · Start aspirin 81 mg p o  Q d   And statin

## 2021-06-25 NOTE — ASSESSMENT & PLAN NOTE
Lab Results   Component Value Date    HGBA1C 9 1 (H) 10/17/2019       No results for input(s): POCGLU in the last 72 hours      Blood Sugar Average: Last 72 hrs:  ·  blood glucose checks q i d , hypoglycemia protocol  · Hold home metformin  · Continue home Lantus 20 units q h s , start sliding scale insulin

## 2021-06-25 NOTE — H&P
[facility]  H&P- Gonzalo Lo 1950, 78 y o  male MRN: [id number]  Unit/Bed#: ED 24 Encounter: [id number]  Primary Care Provider: George Gonzalez MD   Date and time admitted to hospital: 6/24/2021  6:33 PM    * Episode of visual loss of left eye  Assessment & Plan  · Around 2:00 p m  Had complete left visual loss for about 45 minutes  · CT A negative for acute infarct, hemorrhage, no retro-orbital inflammation  · Placed on stroke pathway, telemetry  · Appreciate neurology consult  · Echocardiogram and MRI ordered  · Start aspirin 81 mg p o  Q d  And statin    [facility]  Assessment & Plan  Lab Results   Component Value Date    HGBA1C 9 1 (H) 10/17/2019       No results for input(s): POCGLU in the last 72 hours  Blood Sugar Average: Last 72 hrs:  ·  blood glucose checks q i d , hypoglycemia protocol  · Hold home metformin  · Continue home Lantus 20 units q h s , start sliding scale insulin    History of retinal detachment  Assessment & Plan  · Follows outpatient with ophthalmologist in [place], will continue to do so  · Noticed IOP in the ED  · Pre provider spoke with outpatient ophthalmologist recommended starting Cosopt and brimonidine eyedrops b i d  In left eye      VTE Pharmacologic Prophylaxis: VTE Score: 3 Moderate Risk (Score 3-4) - Pharmacological DVT Prophylaxis Ordered: heparin  Code Status: Level 1 - Full Code per patient      Anticipated Length of Stay: Patient will be admitted on an observation basis with an anticipated length of stay of less than 2 midnights secondary to See above  Total Time for Visit, including Counseling / Coordination of Care: 60 minutes Greater than 50% of this total time spent on direct patient counseling and coordination of care      Chief Complaint:    Chief Complaint   Patient presents with    Eye Problem     pt had detached retna surgery in may, pain in L eye, pt reports that he lost his vision for 45 minutes in the L eye today        History of Present Illness:  Sukumar Banks is a 78 y o  male with a PMH of detached retina in left eye, trigeminal neuralgia of left side of face with numbness at baseline, diabetes mellitus type 2 who presents with complaint of sudden acute left visual loss for about 45 minutes around 2:00 p m  This afternoon  Reports he has had happen in the past only lasting a few seconds to minutes but this lasted a long time  Reports a baseline he has numbness on the right side of his face and denies any numbness on his left side or numbness or tingling anywhere else  Denies any chest pain, abdominal pain, headaches  He reports at the time of the visualized he did have left eye pain  Denies any weakness, slurred speech, facial drooping       Review of Systems:  Review of Systems   Constitutional: Positive for activity change  Eyes: Positive for pain and visual disturbance  Respiratory: Negative for shortness of breath  Cardiovascular: Negative for chest pain  Gastrointestinal: Negative for abdominal pain  Neurological: Negative for dizziness, facial asymmetry, speech difficulty, weakness, numbness and headaches  Past Medical and Surgical History:   History reviewed  No pertinent past medical history  History reviewed  No pertinent surgical history  Meds/Allergies:  Prior to Admission medications    Medication Sig Start Date End Date Taking?  Authorizing Provider   amLODIPine (NORVASC) 5 mg tablet Take 5 mg by mouth daily    Historical Provider, MD   Docusate Sodium (DSS) 100 MG CAPS Take 100 mg by mouth daily    Historical Provider, MD   gabapentin (NEURONTIN) 100 mg capsule Take 100 mg by mouth 2 (two) times a day    Historical Provider, MD   gabapentin (NEURONTIN) 600 MG tablet Take 600 mg by mouth Three times a day    Historical Provider, MD   glipiZIDE (GLUCOTROL) 10 mg tablet Take 10 mg by mouth    Historical Provider, MD   insulin aspart, w/niacinamide, (FIASP) 100 Units/mL injection pen Inject under the skin 3 (three) times a day before meals    Historical Provider, MD   insulin glargine (LANTUS) 100 units/mL subcutaneous injection Inject 20 Units under the skin daily at bedtime    Historical Provider, MD   lisinopril (ZESTRIL) 20 mg tablet Take 20 mg by mouth daily    Historical Provider, MD   Omega-3 Fatty Acids (Omega-3 Fish Oil) 1000 MG CAPS Take 2 g by mouth    Historical Provider, MD     I reviewed home medications per review of chart and partially with patient  Allergies: No Known Allergies    Social History:  Marital Status: /Civil Union     Patient Pre-hospital Living Situation: Home  Patient Pre-hospital Level of Mobility: walks  Patient Pre-hospital Diet Restrictions:  Diabetic  Substance Use History:   Social History     Substance and Sexual Activity   Alcohol Use Not Currently     Social History     Tobacco Use   Smoking Status Never Smoker   Smokeless Tobacco Never Used     Social History     Substance and Sexual Activity   Drug Use Not Currently       Family History:  History reviewed  No pertinent family history  Physical Exam:     Vitals:   Blood Pressure: 139/72 (06/24/21 1945)  Pulse: 63 (06/24/21 1945)  Temperature: 97 7 °F (36 5 °C) (06/24/21 1814)  Temp Source: Oral (06/24/21 1814)  Respirations: 18 (06/24/21 1945)  Height: 6' 1" (185 4 cm) (06/24/21 1814)  SpO2: 100 % (06/24/21 1945)    Physical Exam  Vitals and nursing note reviewed  Constitutional:       General: He is not in acute distress  Appearance: Normal appearance  He is not diaphoretic  HENT:      Head: Normocephalic  Eyes:      Extraocular Movements: Extraocular movements intact  Conjunctiva/sclera: Conjunctivae normal    Cardiovascular:      Rate and Rhythm: Normal rate and regular rhythm  Pulses: Normal pulses  Heart sounds: Normal heart sounds  Pulmonary:      Effort: Pulmonary effort is normal  No respiratory distress        Breath sounds: Normal breath sounds  No wheezing or rales  Abdominal:      General: Abdomen is flat  Bowel sounds are normal  There is no distension  Palpations: Abdomen is soft  Tenderness: There is no abdominal tenderness  There is no guarding  Musculoskeletal:         General: No tenderness  Right lower leg: No edema  Left lower leg: No edema  Skin:     General: Skin is warm and dry  Neurological:      General: No focal deficit present  Mental Status: He is alert and oriented to person, place, and time  Sensory: No sensory deficit  Motor: No weakness  Comments: No facial droop, slurred speech noted, no loss of vision noted during exam   Psychiatric:         Mood and Affect: Mood normal          Behavior: Behavior normal          Thought Content: Thought content normal          Judgment: Judgment normal          Additional Data:     Lab Results:  Results from last 7 days   Lab Units 06/24/21  1946   WBC Thousand/uL 7 04   HEMOGLOBIN g/dL 14 7   HEMATOCRIT % 44 9   PLATELETS Thousands/uL 260   NEUTROS PCT % 62   LYMPHS PCT % 26   MONOS PCT % 8   EOS PCT % 3     Results from last 7 days   Lab Units 06/24/21  1946   SODIUM mmol/L 140   POTASSIUM mmol/L 4 8   CHLORIDE mmol/L 103   CO2 mmol/L 29   BUN mg/dL 32*   CREATININE mg/dL 1 22   ANION GAP mmol/L 8   CALCIUM mg/dL 9 4   ALBUMIN g/dL 4 3   TOTAL BILIRUBIN mg/dL 0 43   ALK PHOS U/L 93   ALT U/L 30   AST U/L 20   GLUCOSE RANDOM mg/dL 123                       Imaging: Reviewed radiology reports from this admission including: CT a  CTA head and neck w wo contrast   Final Result by Chaya Busby MD (06/24 2111)         1  No evidence of acute infarct, intracranial hemorrhage or mass effect  2   Postsurgical change from left vitrectomy  3   No stenosis, dissection or occlusion of the carotid or vertebral arteries or major vessels of the [place]                    Workstation performed: [id number]         MRI Inpatient Order    (Results Pending)           ** Please Note: This note has been constructed using a voice recognition system   **

## 2021-06-25 NOTE — ASSESSMENT & PLAN NOTE
Lab Results   Component Value Date    HGBA1C 7 5 (H) 06/25/2021       Recent Labs     06/25/21  0057 06/25/21  0723 06/25/21  1107   POCGLU 201* 213* 174*       Blood Sugar Average: Last 72 hrs:  · (P) 196   · Continue home regimen on discharge

## 2021-06-25 NOTE — CONSULTS
Consultation - Neurology   Philippe Malik 78 y o  male MRN: [id number]  Unit/Bed#: ED 24 Encounter: [id number]      Assessment/Plan     * Episode of visual loss of left eye  Assessment & Plan  72-year-old male with hypertension, dyslipidemia, DM2 with diabetic neuropathy, trigeminal neuralgia status post balloon compression procedure with chronic right facial numbness, recent left retinal detachment with left vitrectomy, presents with visual disturbance of the left eye  CTA head/neck demonstrated no flow consequential stenosis or LVO  CT head demonstrated no acute changes  Patient's left eye visual acuity has improved since starting eye drops in the emergency department  However, eye sight is not back at baseline  Lower suspicion for acute ischemia as etiology of visual disturbance  Higher likelihood of ophthalmologic such as retinal issue, for which patient had recent procedure  Plan:  -per discussion with primary team, patient to be transferred to alternate facility for formal ophthalmological evaluation   -would obtain MRI brain if patient were to stay here  However, this would delay his transfer  Feel he would most benefit from urgent ophthalmological evaluation   -as such, no further inpatient neurologic recommendations  Trigeminal neuralgia  Assessment & Plan  Chronic right facial numbness post trigeminal nerve balloon procedure  Philippe Malik will not need outpatient follow up with Neurology  He will not require outpatient neurological testing  History of Present Illness     Reason for Consult / Principal Problem:  Left visual disturbance  Hx and PE limited by:  N/A  HPI: Philippe Malik is a 78 y o  left handed male with hypertension, dyslipidemia, DM2 with diabetic neuropathy, trigeminal neuralgia status post balloon compression procedure with chronic right facial numbness, recent left retinal detachment with left vitrectomy, presents with visual disturbance of the left eye      Patient was driving with his wife yesterday when he noticed sudden onset of painful vision loss in the left eye (worsened baseline)  He has had blurred vision since his recent left retinal procedure but feels the visual acuity suddenly worsened  He was only able to make out the outline of his hand  He denies associated headache, numbness, weakness, speech difficulty, walking difficulty  Patient was found to have elevated pressure of 40 in the left eye and after discussion with Ophthalmology, was given eye drops  After the eye drops were administered, patient noted improvement in his vision, but still not at baseline  There were no new focal features on his neurologic exam   He does have evidence of peripheral neuropathy, which is chronic  CTA head/neck demonstrated no flow consequential stenosis or LVO  CT head demonstrated no acute changes  Blood pressure was addition 144/77  Per discussion with Internal Medicine, patient to be transferred to another facility for formal ophthalmologic evaluation  Inpatient consult to Neurology  Consult performed by: Tiarra Bianchi PA-C  Consult ordered by: Daisy Lewis PA-C          Review of Systems   Constitutional: Negative  HENT: Negative  Eyes: Positive for visual disturbance  Respiratory: Negative  Cardiovascular: Negative  Gastrointestinal: Negative  Endocrine: Negative  Genitourinary: Negative  Musculoskeletal: Negative  Skin: Negative for rash  Allergic/Immunologic: Negative  Neurological: Positive for numbness (Chronic right face and bilateral lower extremity)  As above  Hematological: Negative  Psychiatric/Behavioral: Negative  Historical Information   History reviewed  No pertinent past medical history  History reviewed  No pertinent surgical history    Social History   Social History     Substance and Sexual Activity   Alcohol Use Not Currently     Social History     Substance and Sexual Activity Drug Use Not Currently     E-Cigarette/Vaping     E-Cigarette/Vaping Substances     Social History     Tobacco Use   Smoking Status Never Smoker   Smokeless Tobacco Never Used     Family History: History reviewed  No pertinent family history  Review of previous medical records was completed  Meds/Allergies   PTA meds:   Prior to Admission Medications   Prescriptions Last Dose Informant Patient Reported? Taking? Docusate Sodium (DSS) 100 MG CAPS   Yes No   Sig: Take 100 mg by mouth daily   Omega-3 Fatty Acids (Omega-3 Fish Oil) 1000 MG CAPS   Yes No   Sig: Take 2 g by mouth   amLODIPine (NORVASC) 5 mg tablet   Yes No   Sig: Take 5 mg by mouth daily   gabapentin (NEURONTIN) 100 mg capsule   Yes No   Sig: Take 100 mg by mouth 2 (two) times a day   gabapentin (NEURONTIN) 600 MG tablet   Yes No   Sig: Take 600 mg by mouth Three times a day   glipiZIDE (GLUCOTROL) 10 mg tablet   Yes No   Sig: Take 10 mg by mouth   insulin aspart, w/niacinamide, (FIASP) 100 Units/mL injection pen   Yes No   Sig: Inject under the skin 3 (three) times a day before meals   insulin glargine (LANTUS) 100 units/mL subcutaneous injection   Yes No   Sig: Inject 20 Units under the skin daily at bedtime   lisinopril (ZESTRIL) 20 mg tablet   Yes No   Sig: Take 20 mg by mouth daily      Facility-Administered Medications: None       No Known Allergies    Objective   Vitals:Blood pressure 127/66, pulse 57, temperature 97 7 °F (36 5 °C), temperature source Oral, resp  rate (!) 23, height 6' 1" (1 854 m), SpO2 96 %  ,Body mass index is 30 34 kg/m²  No intake or output data in the 24 hours ending 06/25/21 1223    Invasive Devices: Invasive Devices     Peripheral Intravenous Line            Peripheral IV 06/24/21 Right Forearm <1 day                Physical Exam   General:  Patient is well-developed, well-nourished, and in no acute distress  HEENT:  Head normocephalic  Eyes anicteric  Cardiovascular:  With regular rhythm    Lungs:  Normal effort  Nonlabored breathing  Extremities:  With no significant edema  Skin: No rashes  Neurologic Exam  Mental Status:  Patient is alert, pleasantly interactive, and appropriately conversational   No obvious symbolic language difficulty or dysarthria, and the patient is fully oriented  Gait deferred for safety  Cranial Nerves:   II: Visual fields full to confrontation  Diminished visual acuity left eye  Pupils equal, round, reactive to light with normal accomodation  Cannot visualize optic fundi  III,IV,VI: extraocular movements intact with no nystagmus  V:  Diminished pin appreciation right face as compared to left  VII: Face is symmetric with no weakness noted  VIII: Audition intact to finger rub bilaterally  IX/X: Uvula midline  Soft palate elevation symmetric  XII: Tongue midline with no atrophy or fasciculations with appropriate movement  Coordination:  Accurate with finger-to-nose maneuvers bilaterally  Motor testing with no upper or lower extremity drift  Full strength noted throughout  Sensory testing grossly intact to light touch and pin throughout, except for diminished pin appreciation in a stocking distribution in the bilateral distal lower extremities  Reflexes 1 bilateral upper extremities, trace bilateral knees, absent bilateral ankles  Toe responses are downgoing bilaterally  Lab Results:   CBC:   Results from last 7 days   Lab Units 06/24/21  1946   WBC Thousand/uL 7 04   RBC Million/uL 4 69   HEMOGLOBIN g/dL 14 7   HEMATOCRIT % 44 9   MCV fL 96   PLATELETS Thousands/uL 260   , BMP/CMP:   Results from last 7 days   Lab Units 06/24/21 1946   SODIUM mmol/L 140   POTASSIUM mmol/L 4 8   CHLORIDE mmol/L 103   CO2 mmol/L 29   BUN mg/dL 32*   CREATININE mg/dL 1 22   CALCIUM mg/dL 9 4   AST U/L 20   ALT U/L 30   ALK PHOS U/L 93   EGFR ml/min/1 73sq m 61     Imaging Studies: I have personally reviewed pertinent reports     and I have personally reviewed pertinent films in PACS CT head, CTA head/neck  EKG, Pathology, and Other Studies: I have personally reviewed pertinent reports      VTE Prophylaxis: Sequential compression device Austyn Smyth     Code Status: Level 1 - Full Code  Advance Directive and Living Will:      Power of :    POLST:

## 2021-06-26 LAB
ATRIAL RATE: 56 BPM
P AXIS: 30 DEGREES
PR INTERVAL: 174 MS
QRS AXIS: 54 DEGREES
QRSD INTERVAL: 90 MS
QT INTERVAL: 416 MS
QTC INTERVAL: 401 MS
T WAVE AXIS: 35 DEGREES
VENTRICULAR RATE: 56 BPM

## 2021-06-26 PROCEDURE — 93010 ELECTROCARDIOGRAM REPORT: CPT | Performed by: INTERNAL MEDICINE

## 2022-04-21 ENCOUNTER — HOSPITAL ENCOUNTER (OUTPATIENT)
Dept: NON INVASIVE DIAGNOSTICS | Facility: HOSPITAL | Age: 72
Discharge: HOME | End: 2022-04-21
Payer: COMMERCIAL

## 2022-04-21 DIAGNOSIS — I20.9 ANGINA PECTORIS (HCC): ICD-10-CM

## 2022-04-21 LAB
CHEST PAIN STATEMENT: NORMAL
MAX DIASTOLIC BP: 74 MMHG
MAX HEART RATE: 130 BPM
MAX PREDICTED HEART RATE: 151 BPM
MAX. SYSTOLIC BP: 154 MMHG
PROTOCOL NAME: NORMAL
RATE PRESSURE PRODUCT: NORMAL
SL CV STRESS RECOVERY BP: NORMAL MMHG
SL CV STRESS RECOVERY HR: 82 BPM
SL CV STRESS RECOVERY O2 SAT: 98 %
STRESS ANGINA INDEX: 0
STRESS BASELINE BP: NORMAL MMHG
STRESS BASELINE HR: 77 BPM
STRESS O2 SAT REST: 98 %
STRESS PEAK HR: 130 BPM
STRESS PERCENT HR: 86 %
STRESS POST ESTIMATED WORKLOAD: 7 METS
STRESS POST EXERCISE DUR MIN: 4 MIN
STRESS POST EXERCISE DUR SEC: 20 SEC
STRESS POST O2 SAT PEAK: 98 %
STRESS POST PEAK BP: 154 MMHG
STRESS TARGET HR: 130 BPM
TARGET HR FORMULA: NORMAL
TEST INDICATION: NORMAL
TIME IN EXERCISE PHASE: NORMAL

## 2022-04-21 PROCEDURE — 93016 CV STRESS TEST SUPVJ ONLY: CPT | Performed by: INTERNAL MEDICINE

## 2022-04-21 PROCEDURE — 93018 CV STRESS TEST I&R ONLY: CPT | Performed by: INTERNAL MEDICINE

## 2022-04-21 PROCEDURE — 93017 CV STRESS TEST TRACING ONLY: CPT

## 2023-12-04 ENCOUNTER — OFFICE VISIT (OUTPATIENT)
Dept: URGENT CARE | Facility: CLINIC | Age: 73
End: 2023-12-04
Payer: COMMERCIAL

## 2023-12-04 VITALS
OXYGEN SATURATION: 96 % | RESPIRATION RATE: 18 BRPM | DIASTOLIC BLOOD PRESSURE: 88 MMHG | HEART RATE: 98 BPM | SYSTOLIC BLOOD PRESSURE: 144 MMHG | TEMPERATURE: 99.9 F

## 2023-12-04 DIAGNOSIS — R09.89 CHEST CONGESTION: ICD-10-CM

## 2023-12-04 DIAGNOSIS — J06.9 VIRAL URI WITH COUGH: Primary | ICD-10-CM

## 2023-12-04 PROBLEM — R05.9 COUGH: Status: ACTIVE | Noted: 2023-12-04

## 2023-12-04 LAB
SARS-COV-2 AG UPPER RESP QL IA: NEGATIVE
VALID CONTROL: NORMAL

## 2023-12-04 PROCEDURE — G0382 LEV 3 HOSP TYPE B ED VISIT: HCPCS | Performed by: PHYSICIAN ASSISTANT

## 2023-12-04 PROCEDURE — 87811 SARS-COV-2 COVID19 W/OPTIC: CPT | Performed by: PHYSICIAN ASSISTANT

## 2023-12-04 RX ORDER — ESCITALOPRAM OXALATE 20 MG/1
1 TABLET ORAL DAILY
COMMUNITY
Start: 2023-11-27

## 2023-12-04 RX ORDER — MELOXICAM 15 MG/1
1 TABLET ORAL DAILY
COMMUNITY
Start: 2023-08-25

## 2023-12-04 RX ORDER — GUAIFENESIN 600 MG/1
1200 TABLET, EXTENDED RELEASE ORAL EVERY 12 HOURS SCHEDULED
Qty: 30 TABLET | Refills: 0 | Status: SHIPPED | OUTPATIENT
Start: 2023-12-04

## 2023-12-04 RX ORDER — LEVOTHYROXINE SODIUM 0.05 MG/1
1 TABLET ORAL DAILY
COMMUNITY
Start: 2023-11-01

## 2023-12-04 RX ORDER — ALBUTEROL SULFATE 90 UG/1
2 AEROSOL, METERED RESPIRATORY (INHALATION) EVERY 6 HOURS PRN
COMMUNITY
Start: 2023-09-25

## 2023-12-04 RX ORDER — ASPIRIN 81 MG/1
81 TABLET ORAL DAILY
COMMUNITY

## 2023-12-04 RX ORDER — BENZONATATE 200 MG/1
200 CAPSULE ORAL 3 TIMES DAILY PRN
Qty: 20 CAPSULE | Refills: 0 | Status: SHIPPED | OUTPATIENT
Start: 2023-12-04

## 2023-12-04 RX ORDER — ROSUVASTATIN CALCIUM 10 MG/1
1 TABLET, COATED ORAL DAILY
COMMUNITY
Start: 2023-08-24

## 2023-12-04 NOTE — PROGRESS NOTES
[facility]        NAME: Gurdeep Peng is a 81 y.o. male  : 1950    MRN: [id number]  DATE: 2023  TIME: 11:45 AM    Assessment and Plan   Viral URI with cough [J06.9]  1. Viral URI with cough  Poct Covid 19 Rapid Antigen Test    guaiFENesin (MUCINEX) 600 mg 12 hr tablet    benzonatate (TESSALON) 200 MG capsule      2. Chest congestion  Poct Covid 19 Rapid Antigen Test    guaiFENesin (MUCINEX) 600 mg 12 hr tablet            Patient Instructions     Afebrile, clear lung exam. Symptoms consistent with viral syndrome, will treat supportively. He understands to avoid otc decongestants due to HTN, except coricidin. COVID negative. Follow up with PCP in 3-5 days. Proceed to  ER if symptoms worsen. Chief Complaint     Chief Complaint   Patient presents with   • Nasal Congestion     Patient with congestion and productive cough x4 days. Green sputum. History of Present Illness     HPI  Patient is a 82 yo male who presents with congestion, PND, sore throat and productive green cough. No fevers, chest tightness, wheezing. He has not tried anything otc. His wife was sick with similar symptoms, feeling better now. Review of Systems   Review of Systems   Constitutional:  Negative for chills and fever. HENT:  Positive for congestion and sore throat. Negative for ear pain. Respiratory:  Positive for cough. Negative for chest tightness, shortness of breath and wheezing. Cardiovascular: Negative. Gastrointestinal: Negative. Neurological: Negative.           Current Medications       Current Outpatient Medications:   •  benzonatate (TESSALON) 200 MG capsule, Take 1 capsule (200 mg total) by mouth 3 (three) times a day as needed for cough, Disp: 20 capsule, Rfl: 0  •  guaiFENesin (MUCINEX) 600 mg 12 hr tablet, Take 2 tablets (1,200 mg total) by mouth every 12 (twelve) hours, Disp: 30 tablet, Rfl: 0  •  albuterol (PROVENTIL HFA,VENTOLIN HFA) 90 mcg/act inhaler, Inhale 2 puffs every 6 (six) hours as needed, Disp: , Rfl:   •  amLODIPine (NORVASC) 5 mg tablet, Take 5 mg by mouth daily, Disp: , Rfl:   •  aspirin (ECOTRIN LOW STRENGTH) 81 mg EC tablet, Take 81 mg by mouth daily, Disp: , Rfl:   •  Docusate Sodium (DSS) 100 MG CAPS, Take 100 mg by mouth daily, Disp: , Rfl:   •  Empagliflozin (Jardiance) 25 MG TABS, TAKE 1 TABLET (25 MG TOTAL) BY MOUTH DAILY. , Disp: , Rfl:   •  escitalopram (LEXAPRO) 20 mg tablet, Take 1 tablet by mouth daily, Disp: , Rfl:   •  gabapentin (NEURONTIN) 100 mg capsule, Take 100 mg by mouth 2 (two) times a day, Disp: , Rfl:   •  gabapentin (NEURONTIN) 600 MG tablet, Take 600 mg by mouth Three times a day, Disp: , Rfl:   •  glipiZIDE (GLUCOTROL) 10 mg tablet, Take 10 mg by mouth, Disp: , Rfl:   •  insulin aspart, w/niacinamide, (FIASP) 100 Units/mL injection pen, Inject under the skin 3 (three) times a day before meals, Disp: , Rfl:   •  insulin glargine (LANTUS) 100 units/mL subcutaneous injection, Inject 20 Units under the skin daily at bedtime, Disp: , Rfl:   •  levothyroxine 50 mcg tablet, Take 1 tablet by mouth daily, Disp: , Rfl:   •  lisinopril (ZESTRIL) 20 mg tablet, Take 20 mg by mouth daily, Disp: , Rfl:   •  meloxicam (MOBIC) 15 mg tablet, Take 1 tablet by mouth daily, Disp: , Rfl:   •  metFORMIN (GLUCOPHAGE) 1000 MG tablet, Take 1 tablet by mouth 2 (two) times a day, Disp: , Rfl:   •  Omega-3 Fatty Acids (Omega-3 Fish Oil) 1000 MG CAPS, Take 2 g by mouth, Disp: , Rfl:   •  rosuvastatin (CRESTOR) 10 MG tablet, Take 1 tablet by mouth daily, Disp: , Rfl:     Current Allergies     Allergies as of 12/04/2023   • (No Known Allergies)            The following portions of the patient's history were reviewed and updated as appropriate: allergies, current medications, past family history, past medical history, past social history, past surgical history and problem list.     History reviewed. No pertinent past medical history. History reviewed.  No pertinent surgical history. No family history on file. Medications have been verified. Objective   /88   Pulse 98   Temp 99.9 °F (37.7 °C)   Resp 18   SpO2 96%   No LMP for male patient. Physical Exam     Physical Exam  Constitutional:       General: He is not in acute distress. Appearance: Normal appearance. HENT:      Head: Normocephalic and atraumatic. Right Ear: Tympanic membrane, ear canal and external ear normal.      Left Ear: Tympanic membrane, ear canal and external ear normal.      Nose: Nose normal.      Mouth/Throat:      Mouth: Mucous membranes are moist.      Pharynx: Oropharynx is clear. Posterior oropharyngeal erythema present. No oropharyngeal exudate. Eyes:      Conjunctiva/sclera: Conjunctivae normal.   Cardiovascular:      Rate and Rhythm: Normal rate and regular rhythm. Heart sounds: No murmur heard. Pulmonary:      Effort: Pulmonary effort is normal. No respiratory distress. Breath sounds: Normal breath sounds. No wheezing. Musculoskeletal:      Cervical back: Normal range of motion and neck supple. Lymphadenopathy:      Cervical: No cervical adenopathy. Skin:     General: Skin is warm and dry. Coloration: Skin is not pale. Neurological:      General: No focal deficit present. Mental Status: He is alert and oriented to person, place, and time.    Psychiatric:         Mood and Affect: Mood normal.

## 2024-01-02 ENCOUNTER — OFFICE VISIT (OUTPATIENT)
Dept: URGENT CARE | Facility: CLINIC | Age: 74
End: 2024-01-02
Payer: COMMERCIAL

## 2024-01-02 VITALS
HEART RATE: 118 BPM | SYSTOLIC BLOOD PRESSURE: 169 MMHG | DIASTOLIC BLOOD PRESSURE: 90 MMHG | OXYGEN SATURATION: 96 % | TEMPERATURE: 101.5 F | RESPIRATION RATE: 18 BRPM

## 2024-01-02 DIAGNOSIS — H92.09 EARACHE: ICD-10-CM

## 2024-01-02 DIAGNOSIS — K08.89 TOOTHACHE: ICD-10-CM

## 2024-01-02 DIAGNOSIS — H66.92 LEFT OTITIS MEDIA, UNSPECIFIED OTITIS MEDIA TYPE: Primary | ICD-10-CM

## 2024-01-02 PROCEDURE — G0382 LEV 3 HOSP TYPE B ED VISIT: HCPCS | Performed by: PHYSICAL MEDICINE & REHABILITATION

## 2024-01-02 RX ORDER — AMOXICILLIN AND CLAVULANATE POTASSIUM 875; 125 MG/1; MG/1
1 TABLET, FILM COATED ORAL EVERY 12 HOURS SCHEDULED
Qty: 14 TABLET | Refills: 0 | Status: SHIPPED | OUTPATIENT
Start: 2024-01-02 | End: 2024-01-09

## 2024-01-02 RX ORDER — ACETAMINOPHEN 325 MG/1
650 TABLET ORAL EVERY 6 HOURS PRN
Status: SHIPPED | OUTPATIENT
Start: 2024-01-02

## 2024-01-02 RX ADMIN — ACETAMINOPHEN 650 MG: 325 TABLET ORAL at 10:41

## 2024-01-02 NOTE — PROGRESS NOTES
"  [facility]        NAME: Ian Mcgrath is a 73 y.o. male  : 1950    MRN: [id number]  DATE: 2024  TIME: 10:39 AM    Assessment and Plan   Left otitis media, unspecified otitis media type [H66.92]  1. Left otitis media, unspecified otitis media type  amoxicillin-clavulanate (AUGMENTIN) 875-125 mg per tablet      2. Earache  acetaminophen (TYLENOL) tablet 650 mg      3. Toothache          Tylenol given in clinic for temp of 101.5     Patient Instructions     Take antibiotic as prescribed.   Follow up with dentist at 1200.   Follow up with PCP in 3-5 days.  Proceed to  ER if symptoms worsen.    Chief Complaint     Chief Complaint   Patient presents with    Earache    Dental Pain     Verbalizes tooth pain that radiates to Ear.  Going on 4 days. States pulsating pain. Daughter concerned. She states he's \"out of it\" Pt currenlty  AxOx 3. Does grimace with pain.  No interventions.          History of Present Illness       Patient presenting with tooth pain radiating to the right ear that has been ongoing for 4 days. Pain is pulsating and causing discomfort when talking.  Daughter present and worried that patient is out of it because he mistakenly said his daughter was his wife. Currently A&Ox4. No interventions given.  Per patient, does have a dentist appointment for 1200 today but here due to concerns for the ear pain.    Earache     Dental Pain         Review of Systems   Review of Systems   HENT:  Positive for dental problem and ear pain.    Respiratory: Negative.     Cardiovascular: Negative.    Gastrointestinal: Negative.    Musculoskeletal: Negative.          Current Medications       Current Outpatient Medications:     albuterol (PROVENTIL HFA,VENTOLIN HFA) 90 mcg/act inhaler, Inhale 2 puffs every 6 (six) hours as needed, Disp: , Rfl:     amLODIPine (NORVASC) 5 mg tablet, Take 5 mg by mouth daily, Disp: , Rfl:     amoxicillin-clavulanate (AUGMENTIN) 875-125 mg per tablet, Take 1 tablet " by mouth every 12 (twelve) hours for 7 days, Disp: 14 tablet, Rfl: 0    aspirin (ECOTRIN LOW STRENGTH) 81 mg EC tablet, Take 81 mg by mouth daily, Disp: , Rfl:     Docusate Sodium (DSS) 100 MG CAPS, Take 100 mg by mouth daily, Disp: , Rfl:     Empagliflozin (Jardiance) 25 MG TABS, TAKE 1 TABLET (25 MG TOTAL) BY MOUTH DAILY., Disp: , Rfl:     escitalopram (LEXAPRO) 20 mg tablet, Take 1 tablet by mouth daily, Disp: , Rfl:     glipiZIDE (GLUCOTROL) 10 mg tablet, Take 10 mg by mouth, Disp: , Rfl:     insulin aspart, w/niacinamide, (FIASP) 100 Units/mL injection pen, Inject under the skin 3 (three) times a day before meals, Disp: , Rfl:     insulin glargine (LANTUS) 100 units/mL subcutaneous injection, Inject 20 Units under the skin daily at bedtime, Disp: , Rfl:     levothyroxine 50 mcg tablet, Take 1 tablet by mouth daily, Disp: , Rfl:     lisinopril (ZESTRIL) 20 mg tablet, Take 20 mg by mouth daily, Disp: , Rfl:     meloxicam (MOBIC) 15 mg tablet, Take 1 tablet by mouth daily, Disp: , Rfl:     metFORMIN (GLUCOPHAGE) 1000 MG tablet, Take 1 tablet by mouth 2 (two) times a day, Disp: , Rfl:     Omega-3 Fatty Acids (Omega-3 Fish Oil) 1000 MG CAPS, Take 2 g by mouth, Disp: , Rfl:     rosuvastatin (CRESTOR) 10 MG tablet, Take 1 tablet by mouth daily, Disp: , Rfl:     benzonatate (TESSALON) 200 MG capsule, Take 1 capsule (200 mg total) by mouth 3 (three) times a day as needed for cough, Disp: 20 capsule, Rfl: 0    gabapentin (NEURONTIN) 100 mg capsule, Take 100 mg by mouth 2 (two) times a day (Patient not taking: Reported on 1/2/2024), Disp: , Rfl:     gabapentin (NEURONTIN) 600 MG tablet, Take 600 mg by mouth Three times a day (Patient not taking: Reported on 1/2/2024), Disp: , Rfl:     guaiFENesin (MUCINEX) 600 mg 12 hr tablet, Take 2 tablets (1,200 mg total) by mouth every 12 (twelve) hours, Disp: 30 tablet, Rfl: 0    Current Facility-Administered Medications:     acetaminophen (TYLENOL) tablet 650 mg, 650 mg, Oral, Q6H  Zulay CONDE PA-C    Current Allergies     Allergies as of 01/02/2024 - Reviewed 01/02/2024   Allergen Reaction Noted    Gabapentin Hives 01/02/2024            The following portions of the patient's history were reviewed and updated as appropriate: allergies, current medications, past family history, past medical history, past social history, past surgical history and problem list.     History reviewed. No pertinent past medical history.    History reviewed. No pertinent surgical history.    History reviewed. No pertinent family history.      Medications have been verified.        Objective   /90   Pulse (!) 118   Temp (!) 101.5 °F (38.6 °C)   Resp 18   SpO2 96%        Physical Exam     Physical Exam  Vitals reviewed.   Constitutional:       General: He is not in acute distress.     Appearance: He is ill-appearing.      Comments: Patient occasionally grimacing in pain   HENT:      Right Ear: Tympanic membrane is not erythematous.      Left Ear: Swelling and tenderness present. Tympanic membrane is erythematous.      Mouth/Throat:      Mouth: Mucous membranes are moist. No injury, lacerations or oral lesions.      Dentition: No dental tenderness, gingival swelling or dental abscesses.      Pharynx: Oropharynx is clear.      Comments: Could not visibly see any abscess/infection of the gums on the left side   Cardiovascular:      Rate and Rhythm: Regular rhythm. Tachycardia present.      Heart sounds: Normal heart sounds.   Pulmonary:      Effort: Pulmonary effort is normal. No respiratory distress.      Breath sounds: Normal breath sounds.   Neurological:      General: No focal deficit present.      Mental Status: He is alert and oriented to person, place, and time.   Psychiatric:         Mood and Affect: Mood normal.         Behavior: Behavior normal.